# Patient Record
Sex: FEMALE | Race: WHITE | NOT HISPANIC OR LATINO
[De-identification: names, ages, dates, MRNs, and addresses within clinical notes are randomized per-mention and may not be internally consistent; named-entity substitution may affect disease eponyms.]

---

## 2022-01-11 ENCOUNTER — RX ONLY (OUTPATIENT)
Age: 52
Setting detail: RX ONLY
End: 2022-01-11

## 2023-11-15 ENCOUNTER — APPOINTMENT (RX ONLY)
Dept: URBAN - METROPOLITAN AREA CLINIC 83 | Facility: CLINIC | Age: 53
Setting detail: DERMATOLOGY
End: 2023-11-15

## 2023-11-15 DIAGNOSIS — L82.1 OTHER SEBORRHEIC KERATOSIS: ICD-10-CM

## 2023-11-15 DIAGNOSIS — L91.8 OTHER HYPERTROPHIC DISORDERS OF THE SKIN: ICD-10-CM

## 2023-11-15 PROCEDURE — 99202 OFFICE O/P NEW SF 15 MIN: CPT

## 2023-11-15 PROCEDURE — ? ADDITIONAL NOTES

## 2023-11-15 PROCEDURE — ? COUNSELING

## 2023-11-15 ASSESSMENT — LOCATION DETAILED DESCRIPTION DERM
LOCATION DETAILED: LEFT SUPERIOR CENTRAL MALAR CHEEK
LOCATION DETAILED: LEFT LATERAL CANTHUS

## 2023-11-15 ASSESSMENT — LOCATION SIMPLE DESCRIPTION DERM
LOCATION SIMPLE: LEFT EYELID
LOCATION SIMPLE: LEFT CHEEK

## 2023-11-15 ASSESSMENT — LOCATION ZONE DERM
LOCATION ZONE: FACE
LOCATION ZONE: EYELID

## 2023-11-15 NOTE — PROCEDURE: ADDITIONAL NOTES
Detail Level: Simple
Render Risk Assessment In Note?: no
Additional Notes: Patient stated the lesion has been there for a long time, 2 years ago it was treated by LN2 by her previous dermatologist. Patient was advised this needs to be treated with CO2 laser.  Patient advised to make a free consultation with Aidee Lozano

## 2023-11-16 ENCOUNTER — APPOINTMENT (RX ONLY)
Dept: URBAN - METROPOLITAN AREA CLINIC 83 | Facility: CLINIC | Age: 53
Setting detail: DERMATOLOGY
End: 2023-11-16

## 2023-11-16 DIAGNOSIS — Z41.9 ENCOUNTER FOR PROCEDURE FOR PURPOSES OTHER THAN REMEDYING HEALTH STATE, UNSPECIFIED: ICD-10-CM

## 2023-11-16 PROCEDURE — ? PRESCRIPTION

## 2023-11-16 PROCEDURE — ? DESTRUCTION: CO2 LASER

## 2023-11-16 PROCEDURE — ? COSMETIC QUOTE

## 2023-11-16 PROCEDURE — ? COSMETIC CONSULTATION: LASER RESURFACING

## 2023-11-16 RX ORDER — MUPIROCIN 20 MG/G
OINTMENT TOPICAL
Qty: 22 | Refills: 1 | Status: ERX | COMMUNITY
Start: 2023-11-16

## 2023-11-16 RX ADMIN — MUPIROCIN: 20 OINTMENT TOPICAL at 00:00

## 2023-11-16 ASSESSMENT — LOCATION SIMPLE DESCRIPTION DERM: LOCATION SIMPLE: LEFT CHEEK

## 2023-11-16 ASSESSMENT — LOCATION ZONE DERM: LOCATION ZONE: FACE

## 2023-11-16 ASSESSMENT — LOCATION DETAILED DESCRIPTION DERM: LOCATION DETAILED: LEFT INFERIOR CENTRAL MALAR CHEEK

## 2023-11-16 NOTE — PROCEDURE: COSMETIC QUOTE
Laser 17 Units: 0
Misc Procedure 7 Price/Unit (In Dollars- Use Only Numbers And Decimals): 50.00
Breast Procedure 9 Free Text Discount (In Dollars- Use Only Numbers And Decimals): 0.00
Apply Facility Fee: No
Face Procedure 1 Price/Unit (In Dollars- Use Only Numbers And Decimals): 250.00
Laser 10 Price/Unit (In Dollars- Use Only Numbers And Decimals): 1000.00
Injectable 1 Price/Unit (In Dollars- Use Only Numbers And Decimals): 750.00
Laser 4: Full Face-Light
Laser 14: LHR-Lip
Injectable  13 Price/Unit (In Dollars- Use Only Numbers And Decimals): 600.00
Facility Fee Units (Optional): 1
Injectable 5: Volbella 0.55cc
Laser 1: Full Face-Fusion
Laser 18 Price/Unit (In Dollars- Use Only Numbers And Decimals): 2000.00
Injectable 2: Versa
Laser 11: IPL Photofacial
Face Procedure 2: punch excision-lip
Misc Procedure 8: Thread for pox scarring-cheeks
Injectable 9 Price/Unit (In Dollars- Use Only Numbers And Decimals): 800.00
Detail Level: Zone
Laser 8: Profound-Cheek Scarring
Laser 15 Price/Unit (In Dollars- Use Only Numbers And Decimals): 300.00
Misc Procedure 1 Price/Unit (In Dollars- Use Only Numbers And Decimals): 225.00
Laser 5 Price/Unit (In Dollars- Use Only Numbers And Decimals): 500.00
Injectable  14: Qwo/Session
Laser 19: DIMITRIOS RUSS
Injectable 6 Price/Unit (In Dollars- Use Only Numbers And Decimals): 795.00
Misc Procedure 5: Threads Eyelift
Laser 12: IPL Photoface-Face and Neck
Laser 2: Full Face and Neck-Fusion
Injectable 3: Voluma
Laser 6 Price/Unit (In Dollars- Use Only Numbers And Decimals): 1500.00
Misc Procedure 2 Price/Unit (In Dollars- Use Only Numbers And Decimals): 200.00
Misc Procedure 6: Threads Browlift
Injectable 7 Price/Unit (In Dollars- Use Only Numbers And Decimals): 700.00
Laser 9: CO2RE- Pox scarring-cheeks
Laser 16 Price/Unit (In Dollars- Use Only Numbers And Decimals): 400.00
Injectable  11: Botox
Laser 3 Price/Unit (In Dollars- Use Only Numbers And Decimals): 1800.00
Injectable  12: Xeomin
Laser 17: LHR-Arms
Misc Procedure 3: Collagen Stimulating threads Per thread only
Injectable 8: Jeramie Verduzco
Injectable 1: Radiesse
Include Sales Tax On Implants: Yes
Laser 4 Price/Unit (In Dollars- Use Only Numbers And Decimals): 1250.00
Injectable  13: Kybella/Vial
Misc Procedure 4: Threads Neck Lift
Laser 7: VV/PPP on Genitals
Laser 18: LHR-Legs/Back-TBD
Injectable 5 Price/Unit (In Dollars- Use Only Numbers And Decimals): 395.00
Injectable 9: RHA 4
Laser 1 Price/Unit (In Dollars- Use Only Numbers And Decimals): 2500.00
Laser 5: Lesion Removal
Laser 15: Louis Harris
Misc Procedure 1: Threadlift Per Thread
Injectable 6: Volbella 1cc
Laser 2 Price/Unit (In Dollars- Use Only Numbers And Decimals): 3000.00
Injectable 10: RHA 3
Misc Procedure 2: Collagen threads-Lip - lips
Laser 12 Price/Unit (In Dollars- Use Only Numbers And Decimals): 900.00
Laser 6: Sk Removal-Face and Neck
Laser 16: Roselia
Injectable 7: Cat Phi
Laser 3: Full Face-Mid Depth
Injectable  11 Price/Unit (In Dollars- Use Only Numbers And Decimals): 12.00
Injectable 4: Volux
Laser 13: IPL Photofacial-Face, Neck, Chest
Misc Procedure 3 Price/Unit (In Dollars- Use Only Numbers And Decimals): 10.00
Laser 10: CO2RE Intima
Face Procedure 1: mole removal
Misc Procedure 7: [address]

## 2023-11-16 NOTE — PROCEDURE: DESTRUCTION: CO2 LASER
Laser Mode: Fractionated
Energy(Mj-Leave At Zero If Unwanted): 3
Anesthesia Type: 1% lidocaine with epinephrine
Was Feathering Performed?: Yes
Laser Type: CO2 laser
Price (Use Numbers Only, No Special Characters Or $): [address]
Pulse Duration (Usec): 0
External Cooling Fan Speed: 5
Post-Care Statement: Following the procedure, vaseline and a bandage were applied.
Post-Care Instructions: I reviewed with the patient in detail post-care instructions. Patient should avoid sun until the area is fully healed. Pt should apply vaseline to treated areas.
Topical Anesthesia?: 10% benzocaine, 3% lidocaine, 2% tetracaine, 0.01% phenylephrine
Passes: several
Detail Level: Simple
Feathering Statement: Following the treatment the wound edges were feathered using 260mJ.
Treatment Number: 1
Consent: Written consent obtained, risks reviewed including but not limited to crusting, scabbing, blistering, scarring, darker or lighter pigmentary change, incomplete improvement, infection and bleeding.
Wavelength: 10,600nm
Spot Sizes: 3mm

## 2023-11-30 ENCOUNTER — APPOINTMENT (RX ONLY)
Dept: URBAN - METROPOLITAN AREA CLINIC 83 | Facility: CLINIC | Age: 53
Setting detail: DERMATOLOGY
End: 2023-11-30

## 2023-11-30 DIAGNOSIS — Z41.9 ENCOUNTER FOR PROCEDURE FOR PURPOSES OTHER THAN REMEDYING HEALTH STATE, UNSPECIFIED: ICD-10-CM

## 2023-11-30 PROCEDURE — ? COSMETIC FOLLOW-UP

## 2023-11-30 NOTE — PROCEDURE: COSMETIC FOLLOW-UP
Global Improvement: Marked
Comments (Free Text): Treated remaining lesion on left cheek with liquid nitrogen, no charge\\n\\n\\nContinue using tretinoin and zinc based sunblock
Treatment (Optional): Laser Resurfacing
Patient Satisfaction: Very pleased
Detail Level: Zone
Side Effects Or Complications: None

## 2024-12-11 ENCOUNTER — APPOINTMENT (OUTPATIENT)
Dept: URBAN - METROPOLITAN AREA CLINIC 83 | Facility: CLINIC | Age: 54
Setting detail: DERMATOLOGY
End: 2024-12-11

## 2024-12-11 DIAGNOSIS — L57.8 OTHER SKIN CHANGES DUE TO CHRONIC EXPOSURE TO NONIONIZING RADIATION: ICD-10-CM

## 2024-12-11 DIAGNOSIS — L82.1 OTHER SEBORRHEIC KERATOSIS: ICD-10-CM

## 2024-12-11 PROCEDURE — 99213 OFFICE O/P EST LOW 20 MIN: CPT

## 2024-12-11 PROCEDURE — ? COUNSELING

## 2024-12-11 ASSESSMENT — LOCATION SIMPLE DESCRIPTION DERM
LOCATION SIMPLE: NOSE
LOCATION SIMPLE: LEFT CLAVICULAR SKIN

## 2024-12-11 ASSESSMENT — LOCATION ZONE DERM
LOCATION ZONE: TRUNK
LOCATION ZONE: NOSE

## 2024-12-11 ASSESSMENT — LOCATION DETAILED DESCRIPTION DERM
LOCATION DETAILED: NASAL TIP
LOCATION DETAILED: LEFT CLAVICULAR SKIN